# Patient Record
Sex: FEMALE | Race: WHITE | NOT HISPANIC OR LATINO | Employment: FULL TIME | URBAN - METROPOLITAN AREA
[De-identification: names, ages, dates, MRNs, and addresses within clinical notes are randomized per-mention and may not be internally consistent; named-entity substitution may affect disease eponyms.]

---

## 2024-04-23 ENCOUNTER — TELEPHONE (OUTPATIENT)
Dept: GASTROENTEROLOGY | Facility: CLINIC | Age: 69
End: 2024-04-23

## 2024-04-23 ENCOUNTER — PREP FOR PROCEDURE (OUTPATIENT)
Dept: GASTROENTEROLOGY | Facility: CLINIC | Age: 69
End: 2024-04-23

## 2024-04-23 ENCOUNTER — OFFICE VISIT (OUTPATIENT)
Dept: GASTROENTEROLOGY | Facility: CLINIC | Age: 69
End: 2024-04-23
Payer: COMMERCIAL

## 2024-04-23 VITALS
SYSTOLIC BLOOD PRESSURE: 114 MMHG | DIASTOLIC BLOOD PRESSURE: 72 MMHG | HEIGHT: 64 IN | WEIGHT: 170 LBS | BODY MASS INDEX: 29.02 KG/M2

## 2024-04-23 DIAGNOSIS — Z12.11 SCREENING FOR COLON CANCER: Primary | ICD-10-CM

## 2024-04-23 DIAGNOSIS — K21.9 GASTROESOPHAGEAL REFLUX DISEASE WITHOUT ESOPHAGITIS: ICD-10-CM

## 2024-04-23 PROCEDURE — 99204 OFFICE O/P NEW MOD 45 MIN: CPT | Performed by: INTERNAL MEDICINE

## 2024-04-23 RX ORDER — ROSUVASTATIN CALCIUM 10 MG/1
10 TABLET, COATED ORAL DAILY
COMMUNITY

## 2024-04-23 RX ORDER — PAROXETINE 10 MG/1
10 TABLET, FILM COATED ORAL DAILY
COMMUNITY

## 2024-04-23 RX ORDER — LEVOTHYROXINE SODIUM 0.05 MG/1
TABLET ORAL
COMMUNITY

## 2024-04-23 RX ORDER — MULTIVITAMIN
1 TABLET ORAL DAILY
COMMUNITY

## 2024-04-23 RX ORDER — ALBUTEROL SULFATE 90 UG/1
2 AEROSOL, METERED RESPIRATORY (INHALATION) EVERY 6 HOURS PRN
COMMUNITY

## 2024-04-23 NOTE — TELEPHONE ENCOUNTER
Scheduled date of combo (as of today): 5/28/24  Physician performing colonoscopy: Dr. Gomez  Location of colonoscopy: [facility]  Bowel prep reviewed with patient: Clenpiq  Instructions reviewed with patient by: Reema Colin  Clearances: none

## 2024-04-23 NOTE — PROGRESS NOTES
[facility] - Outpatient Consultation  Jamila Shen 68 y.o. female MRN: [id number]  Encounter: [id number]    ASSESSMENT AND PLAN:      1. Screening for colon cancer  Last colonoscopy was 15 years ago elsewhere she was told she did not have to come back for 10 years.  Will schedule screening colonoscopy  - Colonoscopy; Future  - sodium picosulfate, magnesium oxide, citric acid (Clenpiq) oral solution; Take 175 mL (1 bottle) the evening before the colonoscopy, between 5 PM and 9 PM, followed by a second 175 mL bottle 5 hours before the colonoscopy.  Dispense: 350 mL; Refill: 0    2. Gastroesophageal reflux disease without esophagitis  Worsening symptoms on over-the-counter Nexium plan EGD we discussed lifestyle modifications.  - EGD; Future      Followup Appointment: 6 to 8 weeks  ______________________________________________________________________    Chief Complaint   Patient presents with    discuss hiatal hernia    due for colonoscopy       HPI:   Jamila Shen is a 68 y.o. year old female who presents with a variety of symptoms including worsening heartburn and burning in the chest after eating.  She takes Nexium and has taken it over-the-counter for the last several years perhaps 6 out of 7 days.  She notices when she stops it she gets recurrent heartburn.  No dysphagia.  No vomiting or nausea.  Bowels are more on the constipated side but brown and formed no rectal bleeding no melena weight is stable or slightly increasing.    Historical Information   Past Medical History:   Diagnosis Date    Attention deficit     Constipation     Depression     Family history of bone cancer     Family history of lymphoma     Family history of malignant neoplasm of breast     Family history of malignant neoplasm of stomach     Family history of pancreatic cancer     Fatigue     GERD (gastroesophageal reflux disease)     Heartburn     Hiatal hernia     Hypercholesteremia     Hypothyroidism      "Sleep apnea      Past Surgical History:   Procedure Laterality Date    BREAST LUMPECTOMY       Social History     Substance and Sexual Activity   Alcohol Use None     Social History     Substance and Sexual Activity   Drug Use Not on file     Social History     Tobacco Use   Smoking Status Not on file   Smokeless Tobacco Not on file     Family History   Problem Relation Age of Onset    Pancreatic cancer Brother     Stomach cancer Paternal Uncle     Bone cancer Paternal Uncle        Meds/Allergies     Current Outpatient Medications:     albuterol (PROVENTIL HFA,VENTOLIN HFA) 90 mcg/act inhaler    esomeprazole (NexIUM) 20 mg capsule    levothyroxine 50 mcg tablet    Multiple Vitamin (multivitamin) tablet    PARoxetine (PAXIL) 10 mg tablet    rosuvastatin (CRESTOR) 10 MG tablet    sodium picosulfate, magnesium oxide, citric acid (Clenpiq) oral solution    Allergies   Allergen Reactions    Atorvastatin Vomiting    Simvastatin Myalgia       PHYSICAL EXAM:    Blood pressure 114/72, height 5' 4\" (1.626 m), weight 77.1 kg (170 lb). Body mass index is 29.18 kg/m².  General Appearance: NAD, cooperative, alert  Eyes: Anicteric, conjunctiva pink   ENT:  Normocephalic, atraumatic, normal mucosa.    Neck:  Supple, symmetrical, trachea midline,   Resp:  Clear to auscultation bilaterally; no rales, rhonchi or wheezing; respirations unlabored   CV:  S1 S2, Regular rate and rhythm; no murmur, rub, or gallop.  GI:  Soft, non-tender, non-distended; normal bowel sounds; no masses, no organomegaly   Rectal: Deferred  Musculoskeletal: No cyanosis, clubbing or edema. Normal ROM.  Skin:  No jaundice, rashes, or lesions   Heme/Lymph: No palpable cervical lymphadenopathy  Psych: Normal affect, good eye contact  Neuro: No gross deficits, AAOx3    Lab Results:   No results found for: \"WBC\", \"HGB\", \"HCT\", \"MCV\", \"PLT\"  No results found for: \"NA\", \"K\", \"CL\", \"CO2\", \"ANIONGAP\", \"BUN\", \"CREATININE\", \"GLUCOSE\", \"GLUF\", \"CALCIUM\", \"CORRECTEDCA\", " "\"AST\", \"ALT\", \"ALKPHOS\", \"PROT\", \"BILITOT\", \"EGFR\"      Radiology Results:   No results found.      REVIEW OF SYSTEMS:    CONSTITUTIONAL: Denies any fever, chills, rigors, and weight loss.  HEENT: No earache or tinnitus. Denies hearing loss or visual disturbances.  CARDIOVASCULAR: No chest pain or palpitations.   RESPIRATORY: Denies any cough, hemoptysis, shortness of breath or dyspnea on exertion.  GASTROINTESTINAL: As noted in the History of Present Illness.   GENITOURINARY: No problems with urination. Denies any hematuria or dysuria.  NEUROLOGIC: No dizziness or vertigo, denies headaches.   MUSCULOSKELETAL: Denies any muscle or joint pain.   SKIN: Denies skin rashes or itching.   ENDOCRINE: Denies excessive thirst. Denies intolerance to heat or cold.  PSYCHOSOCIAL: Denies depression or anxiety. Denies any recent memory loss.     "

## 2024-05-14 ENCOUNTER — ANESTHESIA (OUTPATIENT)
Dept: ANESTHESIOLOGY | Facility: AMBULATORY SURGERY CENTER | Age: 69
End: 2024-05-14

## 2024-05-14 ENCOUNTER — ANESTHESIA EVENT (OUTPATIENT)
Dept: ANESTHESIOLOGY | Facility: AMBULATORY SURGERY CENTER | Age: 69
End: 2024-05-14

## 2024-05-28 ENCOUNTER — HOSPITAL ENCOUNTER (OUTPATIENT)
Dept: GASTROENTEROLOGY | Facility: AMBULATORY SURGERY CENTER | Age: 69
Discharge: HOME | End: 2024-05-28
Payer: COMMERCIAL

## 2024-05-28 ENCOUNTER — ANESTHESIA (OUTPATIENT)
Dept: GASTROENTEROLOGY | Facility: AMBULATORY SURGERY CENTER | Age: 69
End: 2024-05-28

## 2024-05-28 ENCOUNTER — ANESTHESIA EVENT (OUTPATIENT)
Dept: GASTROENTEROLOGY | Facility: AMBULATORY SURGERY CENTER | Age: 69
End: 2024-05-28

## 2024-05-28 VITALS
DIASTOLIC BLOOD PRESSURE: 79 MMHG | OXYGEN SATURATION: 98 % | SYSTOLIC BLOOD PRESSURE: 131 MMHG | HEIGHT: 64 IN | BODY MASS INDEX: 28.17 KG/M2 | HEART RATE: 61 BPM | RESPIRATION RATE: 19 BRPM | TEMPERATURE: 98.2 F | WEIGHT: 165 LBS

## 2024-05-28 DIAGNOSIS — K21.9 GASTROESOPHAGEAL REFLUX DISEASE WITHOUT ESOPHAGITIS: ICD-10-CM

## 2024-05-28 DIAGNOSIS — Z12.11 SCREENING FOR COLON CANCER: ICD-10-CM

## 2024-05-28 PROBLEM — G47.30 SLEEP APNEA: Status: ACTIVE | Noted: 2024-05-28

## 2024-05-28 PROCEDURE — 45380 COLONOSCOPY AND BIOPSY: CPT | Performed by: INTERNAL MEDICINE

## 2024-05-28 PROCEDURE — 43239 EGD BIOPSY SINGLE/MULTIPLE: CPT | Performed by: INTERNAL MEDICINE

## 2024-05-28 PROCEDURE — 88305 TISSUE EXAM BY PATHOLOGIST: CPT | Performed by: PATHOLOGY

## 2024-05-28 PROCEDURE — 45388 COLONOSCOPY W/ABLATION: CPT | Performed by: INTERNAL MEDICINE

## 2024-05-28 RX ORDER — PROPOFOL 10 MG/ML
INJECTION, EMULSION INTRAVENOUS AS NEEDED
Status: DISCONTINUED | OUTPATIENT
Start: 2024-05-28 | End: 2024-05-28

## 2024-05-28 RX ORDER — SODIUM CHLORIDE, SODIUM LACTATE, POTASSIUM CHLORIDE, CALCIUM CHLORIDE 600; 310; 30; 20 MG/100ML; MG/100ML; MG/100ML; MG/100ML
50 INJECTION, SOLUTION INTRAVENOUS CONTINUOUS
Status: DISCONTINUED | OUTPATIENT
Start: 2024-05-28 | End: 2024-06-01 | Stop reason: HOSPADM

## 2024-05-28 RX ORDER — LIDOCAINE HYDROCHLORIDE 20 MG/ML
INJECTION, SOLUTION EPIDURAL; INFILTRATION; INTRACAUDAL; PERINEURAL AS NEEDED
Status: DISCONTINUED | OUTPATIENT
Start: 2024-05-28 | End: 2024-05-28

## 2024-05-28 RX ADMIN — PROPOFOL 50 MG: 10 INJECTION, EMULSION INTRAVENOUS at 11:22

## 2024-05-28 RX ADMIN — PROPOFOL 110 MG: 10 INJECTION, EMULSION INTRAVENOUS at 11:01

## 2024-05-28 RX ADMIN — PROPOFOL 50 MG: 10 INJECTION, EMULSION INTRAVENOUS at 11:14

## 2024-05-28 RX ADMIN — PROPOFOL 50 MG: 10 INJECTION, EMULSION INTRAVENOUS at 11:26

## 2024-05-28 RX ADMIN — PROPOFOL 50 MG: 10 INJECTION, EMULSION INTRAVENOUS at 11:08

## 2024-05-28 RX ADMIN — PROPOFOL 50 MG: 10 INJECTION, EMULSION INTRAVENOUS at 11:37

## 2024-05-28 RX ADMIN — SODIUM CHLORIDE, SODIUM LACTATE, POTASSIUM CHLORIDE, CALCIUM CHLORIDE 50 ML/HR: 600; 310; 30; 20 INJECTION, SOLUTION INTRAVENOUS at 10:52

## 2024-05-28 RX ADMIN — PROPOFOL 20 MG: 10 INJECTION, EMULSION INTRAVENOUS at 11:11

## 2024-05-28 RX ADMIN — PROPOFOL 50 MG: 10 INJECTION, EMULSION INTRAVENOUS at 11:31

## 2024-05-28 RX ADMIN — LIDOCAINE HYDROCHLORIDE 100 MG: 20 INJECTION, SOLUTION EPIDURAL; INFILTRATION; INTRACAUDAL; PERINEURAL at 11:01

## 2024-05-28 RX ADMIN — PROPOFOL 50 MG: 10 INJECTION, EMULSION INTRAVENOUS at 11:18

## 2024-05-28 RX ADMIN — PROPOFOL 50 MG: 10 INJECTION, EMULSION INTRAVENOUS at 11:05

## 2024-05-28 NOTE — ANESTHESIA PREPROCEDURE EVALUATION
Procedure:  COLONOSCOPY  EGD    Relevant Problems   PULMONARY   (+) Sleep apnea   (-) Smoking   (-) URI (upper respiratory infection)        Physical Exam    Airway    Mallampati score: II  TM Distance: >3 FB  Neck ROM: full     Dental   No notable dental hx     Cardiovascular      Pulmonary      Other Findings  post-pubertal.      Anesthesia Plan  ASA Score- 2     Anesthesia Type- IV sedation with anesthesia with ASA Monitors.         Additional Monitors:     Airway Plan:            Plan Factors-Exercise tolerance (METS): >4 METS.    Chart reviewed.    Patient summary reviewed.    Patient is not a current smoker.              Induction- intravenous.    Postoperative Plan-         Informed Consent- Anesthetic plan and risks discussed with patient.  I personally reviewed this patient with the CRNA. Discussed and agreed on the Anesthesia Plan with the CRNA..

## 2024-05-28 NOTE — ANESTHESIA POSTPROCEDURE EVALUATION
Post-Op Assessment Note    CV Status:  Stable  Pain Score: 0    Pain management: adequate       Mental Status:  Sleepy   Hydration Status:  Stable   PONV Controlled:  None   Airway Patency:  Patent     Post Op Vitals Reviewed: Yes    No anethesia notable event occurred.    Staff: Anesthesiologist, CRNA               BP   108/68   Temp      Pulse  68   Resp   14   SpO2   99

## 2024-05-28 NOTE — H&P
History and Physical - [facility]  Jamila Shen 68 y.o. female MRN: [id number]    HPI: Jamila Shen is a 68 y.o. year old female who presents for EGD colonoscopy for reflux and screening for colon cancer    REVIEW OF SYSTEMS: Per the HPI, and otherwise unremarkable.    Historical Information   Past Medical History:   Diagnosis Date    Allergies     takes inhaler    Anxiety     Attention deficit     Constipation     Family history of bone cancer     Family history of lymphoma     Family history of malignant neoplasm of breast     Family history of malignant neoplasm of stomach     Family history of pancreatic cancer     Fatigue     GERD (gastroesophageal reflux disease)     Heartburn     Hiatal hernia     Hypercholesteremia     Hypothyroidism     Sleep apnea      Past Surgical History:   Procedure Laterality Date    BREAST LUMPECTOMY       Social History   Social History     Substance and Sexual Activity   Alcohol Use Yes    Comment: 3 drinks / week     Social History     Substance and Sexual Activity   Drug Use Never     Social History     Tobacco Use   Smoking Status Never   Smokeless Tobacco Never     Family History   Problem Relation Age of Onset    Pancreatic cancer Brother     Stomach cancer Paternal Uncle     Bone cancer Paternal Uncle        Meds/Allergies       Current Outpatient Medications:     levothyroxine 50 mcg tablet    Multiple Vitamin (multivitamin) tablet    PARoxetine (PAXIL) 10 mg tablet    rosuvastatin (CRESTOR) 10 MG tablet    sodium picosulfate, magnesium oxide, citric acid (Clenpiq) oral solution    albuterol (PROVENTIL HFA,VENTOLIN HFA) 90 mcg/act inhaler    esomeprazole (NexIUM) 20 mg capsule    Current Facility-Administered Medications:     lactated ringers infusion, 50 mL/hr, Intravenous, Continuous    Allergies   Allergen Reactions    Atorvastatin Vomiting    Simvastatin Myalgia       Objective     /71   Pulse 75   Temp 98.2 °F (36.8 °C) (Temporal)   Resp 14   " Ht 5' 4\" (1.626 m)   Wt 74.8 kg (165 lb)   SpO2 96%   BMI 28.32 kg/m²     PHYSICAL EXAM    Gen: NAD AAOx3  Head: Normocephalic, Atraumatic  CV: S1S2 RRR no m/r/g  CHEST: Clear b/l no c/r/w  ABD: soft, +BS NT/ND  EXT: no edema    ASSESSMENT/PLAN:  This is a 68 y.o. year old female here for EGD colonoscopy, and she is stable and optimized for her procedure.        "

## 2024-05-30 PROCEDURE — 88305 TISSUE EXAM BY PATHOLOGIST: CPT | Performed by: PATHOLOGY

## 2024-06-06 NOTE — RESULT ENCOUNTER NOTE
EGD biopsies all benign.  Equivocal biopsies of the esophagus for Young's.  Spoke with patient.  3-year EGD recall.  Colon polyps benign hyperplastic 3-year colonoscopy follow-up as well.

## 2025-01-14 ENCOUNTER — OFFICE VISIT (OUTPATIENT)
Dept: ENDOCRINOLOGY | Facility: HOSPITAL | Age: 70
End: 2025-01-14
Payer: COMMERCIAL

## 2025-01-14 VITALS
DIASTOLIC BLOOD PRESSURE: 80 MMHG | SYSTOLIC BLOOD PRESSURE: 122 MMHG | HEIGHT: 64 IN | HEART RATE: 77 BPM | WEIGHT: 167.2 LBS | BODY MASS INDEX: 28.54 KG/M2 | OXYGEN SATURATION: 98 %

## 2025-01-14 DIAGNOSIS — E03.9 ACQUIRED HYPOTHYROIDISM: Primary | ICD-10-CM

## 2025-01-14 PROCEDURE — 99204 OFFICE O/P NEW MOD 45 MIN: CPT | Performed by: INTERNAL MEDICINE

## 2025-01-14 RX ORDER — UBIQUINOL 100 MG
CAPSULE ORAL DAILY
COMMUNITY

## 2025-01-14 RX ORDER — GINSENG 100 MG
CAPSULE ORAL DAILY
COMMUNITY

## 2025-01-14 RX ORDER — MULTIVIT-MIN/IRON/FOLIC ACID/K 18-600-40
CAPSULE ORAL DAILY
COMMUNITY

## 2025-01-14 RX ORDER — VITAMIN B COMPLEX
1 CAPSULE ORAL DAILY
COMMUNITY

## 2025-01-14 RX ORDER — FAMOTIDINE 20 MG
TABLET ORAL DAILY
COMMUNITY

## 2025-01-14 RX ORDER — MAGNESIUM 200 MG
TABLET ORAL DAILY
COMMUNITY

## 2025-01-14 RX ORDER — AZELASTINE HYDROCHLORIDE 137 UG/1
SPRAY, METERED NASAL
COMMUNITY
Start: 2025-01-07

## 2025-01-14 NOTE — PROGRESS NOTES
1/15/2025    Assessment & Plan      Diagnoses and all orders for this visit:    Acquired hypothyroidism  -     T4, free; Future  -     TSH, 3rd generation; Future  -     Thyroid Peroxidase and Thyroglobulin Antibodies    Other orders  -     Azelastine HCl 137 MCG/SPRAY SOLN; SPRAY 2 SPRAYS INTO EACH NOSTRIL TWICE A DAY  -     Zinc 50 MG TABS; Take by mouth in the morning  -     Bacillus Coagulans-Inulin (PROBIOTIC-PREBIOTIC PO); Take by mouth in the morning  -     Ubiquinol (Qunol CoQ10/Ubiquinol/Cristofer) 100 MG CAPS; Take by mouth in the morning  -     Ascorbic Acid (Vitamin C) 500 MG CAPS; Take by mouth in the morning  -     Vitamin D, Cholecalciferol, 25 MCG (1000 UT) CAPS; Take by mouth in the morning  -     Magnesium 200 MG TABS; Take by mouth in the morning  -     b complex vitamins capsule; Take 1 capsule by mouth daily        Assessment & Plan  1. Hypothyroidism.  Her TSH levels have increased from 0.54 in 2017 to 3.56, indicating a progression towards underactive thyroid function. Symptoms of fatigue, brain fog, and slight depression have returned over the past few weeks. Blood work will be ordered to assess current thyroid levels, including TSH, free T4, and thyroid antibodies to check for Hashimoto's disease. No changes to her levothyroxine dosage will be made until the results are available. If her thyroid levels are within the desired range on the generic medication and she continues to experience symptoms, a switch to brand-name medication may be considered.    2. Anxiety.  She reports increased anxiety and is currently on Paxil 10 mg.    3. Sleep Apnea.  She has been diagnosed with sleep apnea and is unable to tolerate a CPAP mask. Untreated sleep apnea may contribute to her fatigue and brain fog.    4. Hiatal Hernia.  She has a history of hiatal hernia and takes Nexium 20 mg as needed for heartburn and reflux symptoms.    5. Cholesterol management.  She is on rosuvastatin 10 mg for cholesterol  management.    She will get blood work performed at her earliest convenience consisting of a TSH, free T4, thyroid peroxidase antibodies and antithyroglobulin antibodies.    Follow-up will be determined based on the studies.        CC: Hypothyroid follow-up    History of Present Illness    HPI: Jamila Shen is a 69-year-old female here for evaluation/consultation regarding her hypothyroidism.    She was initially diagnosed with hypothyroidism in 2017, at which time she was started on thyroid medication that alleviated her symptoms.  She has noticed resumption of a lot of her symptoms over the last several weeks to months despite normal blood work and would like endocrine evaluation.  She did see an endocrinologist at the [facility] back in 2018 but has not followed with endocrinology since as has been following with her primary care physician.    She has been on a consistent regimen of generic levothyroxine, taking 50 mcg 1 tablet 5 days a week and half a tablet on Tuesdays and Thursdays, administered on an empty stomach in the morning, waiting at least half an hour before eating. Despite this, she reports persistent fatigue, waking up feeling unrested, experiencing brain fog, slight depression, and difficulty focusing. These symptoms have been present for several weeks. She also reports a constant feeling of coldness, mild palpitations, dry skin, and generalized hair loss. She does not experience hot flashes, tremors, or frequent nail breakage. She has not had any radiation treatment to her head or neck. Her weight has remained stable. She has no issues with swallowing. She maintains an active lifestyle, including regular gym workouts, chiropractic sessions, and massage therapy. She has noticed a decline in her memory function. She was initially diagnosed with hypothyroidism in 2017, at which time she was started on thyroid medication that alleviated her symptoms.    She has a history of anxiety and  is currently on Paxil 10 mg. She reports feeling more anxious and down than usual.    She has a history of constipation, which is managed with prunes. She has been diagnosed with sleep apnea and is a mouth breather, which causes dryness. She has not been able to tolerate a CPAP mask. She is currently under the care of an ENT specialist for her nasal issues.    She takes Nexium 20 mg as needed for heartburn, but lately she has been needing it more. She was recently diagnosed with a hiatal hernia.    She takes rosuvastatin 10 mg for cholesterol management.      FAMILY HISTORY  Both of her sisters have hypothyroidism. Her mother had hypothyroidism, stroke, and heart attack.           Historical Information   Past Medical History:   Diagnosis Date    Allergies     takes inhaler    Anxiety     Attention deficit     Constipation     Family history of bone cancer     Family history of lymphoma     Family history of malignant neoplasm of breast     Family history of malignant neoplasm of stomach     Family history of pancreatic cancer     Fatigue     GERD (gastroesophageal reflux disease)     Hiatal hernia     Hypercholesteremia     Hypothyroidism     Sleep apnea     unable to tolerate mask, mouth breather     Past Surgical History:   Procedure Laterality Date    BREAST LUMPECTOMY Left     benign    CATARACT EXTRACTION Bilateral     KNEE ARTHROSCOPY Right     torn meniscus    TONSILLECTOMY       Social History   Social History     Substance and Sexual Activity   Alcohol Use Yes    Alcohol/week: 2.0 standard drinks of alcohol    Types: 2 Glasses of wine per week    Comment: 3 drinks / week     Social History     Substance and Sexual Activity   Drug Use Never     Social History     Tobacco Use   Smoking Status Never   Smokeless Tobacco Never     Family History:   Family History   Problem Relation Age of Onset    Hypothyroidism Mother     Heart attack Mother     Stroke Mother     Hypertension Father     Lymphoma Father      "Heart disease Father     Hypothyroidism Sister     Diabetes type II Sister     Hypertension Sister     Stroke Sister     Hypothyroidism Sister     Neuropathy Sister     Diabetes type II Brother     Hyperlipidemia Brother     Heart disease Brother         post CABG    Pancreatic cancer Brother     No Known Problems Brother     Stomach cancer Paternal Uncle     Bone cancer Paternal Uncle        Meds/Allergies   Current Outpatient Medications   Medication Sig Dispense Refill    albuterol (PROVENTIL HFA,VENTOLIN HFA) 90 mcg/act inhaler Inhale 2 puffs every 6 (six) hours as needed for wheezing For allergies      Ascorbic Acid (Vitamin C) 500 MG CAPS Take by mouth in the morning      Azelastine HCl 137 MCG/SPRAY SOLN SPRAY 2 SPRAYS INTO EACH NOSTRIL TWICE A DAY      b complex vitamins capsule Take 1 capsule by mouth daily      Bacillus Coagulans-Inulin (PROBIOTIC-PREBIOTIC PO) Take by mouth in the morning      esomeprazole (NexIUM) 20 mg capsule Take 20 mg by mouth every morning before breakfast      levothyroxine 50 mcg tablet TAKE 1 TO 2 TABLET BY MOUTH 2 DAYS A WEEK AND THEN 1 TABLET FOR REST OF OTHER DAYS (Patient taking differently: One tablet Monday, Wednesday, Friday, Saturday, and Sunday then 1/2 a tablet Tuesday and Thursday)      Magnesium 200 MG TABS Take by mouth in the morning      PARoxetine (PAXIL) 10 mg tablet Take 10 mg by mouth daily      rosuvastatin (CRESTOR) 10 MG tablet Take 10 mg by mouth daily      Ubiquinol (Qunol CoQ10/Ubiquinol/Cristofer) 100 MG CAPS Take by mouth in the morning      Vitamin D, Cholecalciferol, 25 MCG (1000 UT) CAPS Take by mouth in the morning      Zinc 50 MG TABS Take by mouth in the morning       No current facility-administered medications for this visit.     Allergies   Allergen Reactions    Atorvastatin Vomiting    Simvastatin Myalgia       Objective   Vitals: Blood pressure 122/80, pulse 77, height 5' 4\" (1.626 m), weight 75.8 kg (167 lb 3.2 oz), SpO2 98%.  Invasive Devices  "      None                   Physical Exam    No lid lag, stare, proptosis, or periorbital edema.  Thyroid is normal in size. No palpable thyroid nodules. No bruits over the thyroid gland.  Lungs are clear to auscultation.  Heart has a regular rate and rhythm. No murmurs.  No tremor of the outstretched hands. Patellar deep tendon reflexes are normal. No lower extremity edema.      The history was obtained from the review of the chart and from the patient.    Lab Results:      Blood work performed on 8/16/2017 when she was still seeing endocrinology showed a TSH of 0.54 with a free T4 of 1.57.    Thyroid peroxidase antibodies were mildly elevated at 9 and antithyroglobulin antibodies were less than 1 in 2017.    Blood work on 9/6/2024 showed a TSH of 3.56 with a 25-hydroxy vitamin D level of 59.7.          No future appointments.

## 2025-01-14 NOTE — PATIENT INSTRUCTIONS
Given the symptoms, we'll check thyroid blood work now.     No change in levothyroxine dosage until we get the blood work.     Follow up to be determined.

## 2025-01-27 ENCOUNTER — TELEPHONE (OUTPATIENT)
Age: 70
End: 2025-01-27

## 2025-01-27 NOTE — TELEPHONE ENCOUNTER
Patient calling asking if Dr. Tim can review her labs from 1/14 and call her to discuss the results.

## 2025-01-31 DIAGNOSIS — E03.9 ACQUIRED HYPOTHYROIDISM: Primary | ICD-10-CM

## 2025-01-31 RX ORDER — LEVOTHYROXINE SODIUM 50 UG/1
TABLET ORAL
Qty: 90 TABLET | Refills: 1 | Status: SHIPPED | OUTPATIENT
Start: 2025-01-31